# Patient Record
Sex: FEMALE | Race: WHITE | Employment: UNEMPLOYED | URBAN - METROPOLITAN AREA
[De-identification: names, ages, dates, MRNs, and addresses within clinical notes are randomized per-mention and may not be internally consistent; named-entity substitution may affect disease eponyms.]

---

## 2024-01-01 ENCOUNTER — APPOINTMENT (OUTPATIENT)
Dept: GENERAL RADIOLOGY | Age: 39
End: 2024-01-01
Payer: COMMERCIAL

## 2024-01-01 ENCOUNTER — HOSPITAL ENCOUNTER (EMERGENCY)
Age: 39
Discharge: HOME | End: 2024-01-02
Attending: STUDENT IN AN ORGANIZED HEALTH CARE EDUCATION/TRAINING PROGRAM
Payer: COMMERCIAL

## 2024-01-01 DIAGNOSIS — J01.90 ACUTE SINUSITIS, RECURRENCE NOT SPECIFIED, UNSPECIFIED LOCATION: ICD-10-CM

## 2024-01-01 DIAGNOSIS — J06.9 ACUTE UPPER RESPIRATORY INFECTION: Primary | ICD-10-CM

## 2024-01-01 PROCEDURE — 99283 EMERGENCY DEPT VISIT LOW MDM: CPT

## 2024-01-01 PROCEDURE — 6370000000 HC RX 637 (ALT 250 FOR IP): Performed by: STUDENT IN AN ORGANIZED HEALTH CARE EDUCATION/TRAINING PROGRAM

## 2024-01-01 PROCEDURE — 71045 X-RAY EXAM CHEST 1 VIEW: CPT

## 2024-01-01 RX ORDER — PREDNISONE 20 MG/1
TABLET ORAL
COMMUNITY
Start: 2023-12-27 | End: 2024-01-06

## 2024-01-01 RX ORDER — LEVOFLOXACIN 750 MG/1
750 TABLET, FILM COATED ORAL DAILY
Qty: 10 TABLET | Refills: 0 | Status: SHIPPED | OUTPATIENT
Start: 2024-01-01 | End: 2024-01-02

## 2024-01-01 RX ORDER — ONDANSETRON 4 MG/1
4 TABLET, ORALLY DISINTEGRATING ORAL ONCE
Status: COMPLETED | OUTPATIENT
Start: 2024-01-02 | End: 2024-01-01

## 2024-01-01 RX ORDER — BENZONATATE 200 MG/1
200 CAPSULE ORAL EVERY 8 HOURS PRN
COMMUNITY
Start: 2023-12-11

## 2024-01-01 RX ORDER — ONDANSETRON 4 MG/1
4 TABLET, ORALLY DISINTEGRATING ORAL EVERY 6 HOURS PRN
Qty: 21 TABLET | Refills: 0 | Status: SHIPPED | OUTPATIENT
Start: 2024-01-01 | End: 2024-01-02

## 2024-01-01 RX ORDER — LEVOFLOXACIN 500 MG/1
750 TABLET, FILM COATED ORAL ONCE
Status: COMPLETED | OUTPATIENT
Start: 2024-01-02 | End: 2024-01-01

## 2024-01-01 RX ORDER — DOXYCYCLINE HYCLATE 100 MG
100 TABLET ORAL ONCE
Status: DISCONTINUED | OUTPATIENT
Start: 2024-01-02 | End: 2024-01-01

## 2024-01-01 RX ADMIN — ONDANSETRON 4 MG: 4 TABLET, ORALLY DISINTEGRATING ORAL at 23:58

## 2024-01-01 RX ADMIN — LEVOFLOXACIN 750 MG: 500 TABLET, FILM COATED ORAL at 23:58

## 2024-01-01 ASSESSMENT — LIFESTYLE VARIABLES
HOW OFTEN DO YOU HAVE A DRINK CONTAINING ALCOHOL: NEVER
HOW MANY STANDARD DRINKS CONTAINING ALCOHOL DO YOU HAVE ON A TYPICAL DAY: PATIENT DOES NOT DRINK

## 2024-01-02 VITALS
OXYGEN SATURATION: 100 % | RESPIRATION RATE: 18 BRPM | DIASTOLIC BLOOD PRESSURE: 84 MMHG | BODY MASS INDEX: 28.77 KG/M2 | TEMPERATURE: 97.7 F | HEIGHT: 66 IN | SYSTOLIC BLOOD PRESSURE: 131 MMHG | HEART RATE: 81 BPM | WEIGHT: 179 LBS

## 2024-01-02 RX ORDER — ONDANSETRON 4 MG/1
4 TABLET, ORALLY DISINTEGRATING ORAL EVERY 6 HOURS PRN
Qty: 21 TABLET | Refills: 0 | Status: SHIPPED | OUTPATIENT
Start: 2024-01-02

## 2024-01-02 RX ORDER — LEVOFLOXACIN 750 MG/1
750 TABLET, FILM COATED ORAL DAILY
Qty: 10 TABLET | Refills: 0 | Status: SHIPPED | OUTPATIENT
Start: 2024-01-02 | End: 2024-01-12

## 2024-01-02 ASSESSMENT — PAIN - FUNCTIONAL ASSESSMENT: PAIN_FUNCTIONAL_ASSESSMENT: NONE - DENIES PAIN

## 2024-01-02 NOTE — ED PROVIDER NOTES
Emergency Department Encounter    Patient: Yina Gray  MRN: [id number]  : 1985  Date of Evaluation: 2024  ED Provider:  Ricky Cain MD    Triage Chief Complaint:   Cough (Pt ambulates into ED with complaints of cough for the past 3 weeks.  States had covid 3 weeks ago and took the paxlovid and states after finishing it she felt worse.  Was seen at urgent care recently and prescribed prednisone and tessalon perles.  States she finished her amoxicillin and motrin.  Also states she is coughing so much she is vomiting mucus.)    [place]:  Yina Gray is a 38 y.o. female with history seen below presenting with complaints of nasal congestion, cough with nausea vomiting.  Patient states for the past 3 weeks she has had nasal congestion, sinus pressure, cough.  States she is coughing so much she feels sore.  States she feels chest discomfort diffusely over the chest which she states she believes secondary to the cough she denies shortness of breath.  States she is having so much drainage that she has been having some nausea and vomiting that is mostly mucus.  Denies any abdominal pain, diarrhea constipation, blood or melena stools, urinary symptoms including dysuria, increased frequency, urgency, hematuria.  Denies vaginal bleeding or discharge.  States she has had a tubal ligation in the past and is not sexually active and does not wish to be tested for pregnancy.  States she has pressure over her sinuses but denies any other headache, blurred vision, focal deficits, motor or sensory changes, neck pain or stiffness.  States she was diagnosed with COVID about 3 weeks ago started Paxlovid.  States she felt like she got worse after she stopped the Paxlovid.  States she was also diagnosed with otitis media at that time was started on Augmentin.  States that her otalgia has improved but the cough, nasal congestion, sinus pressure have continued.  She states she denies any worsening symptoms over the

## 2024-01-02 NOTE — DISCHARGE INSTRUCTIONS
Follow-up with primary doctor soon as able for reevaluation.  N/A do not have a current primary doctor I ordered for an urgent PCP follow-up for you.  You should be getting a call within the next 1-2 business days if you do not hear from them by tomorrow afternoon give them a call their number is above.  Also follow-up with the ear nose and throat the numbers above as well.  Will treat you with Levaquin as well as Zofran for the nausea.  I know we discussed laboratory evaluation and further imaging in the ED but he would like to hold off at this time.  Given her reassuring vitals and clinical picture I do believe it is reasonable as long as you are agreeable to strict return precautions and close follow-up return for any shortness of breath, chest pain, abdominal pain, worsening nausea or vomiting, fevers, difficulty swallowing or handling secretions or any new change or worsening symptoms were always here for evaluation never hesitate to return